# Patient Record
Sex: MALE
[De-identification: names, ages, dates, MRNs, and addresses within clinical notes are randomized per-mention and may not be internally consistent; named-entity substitution may affect disease eponyms.]

---

## 2023-03-16 ENCOUNTER — HOSPITAL ENCOUNTER (OUTPATIENT)
Dept: HOSPITAL 90 - RAH | Age: 58
Discharge: HOME | End: 2023-03-16
Attending: INTERNAL MEDICINE
Payer: COMMERCIAL

## 2023-03-16 DIAGNOSIS — I08.0: Primary | ICD-10-CM

## 2023-03-16 DIAGNOSIS — R01.1: ICD-10-CM

## 2023-03-16 PROCEDURE — 93306 TTE W/DOPPLER COMPLETE: CPT

## 2025-03-01 ENCOUNTER — HOSPITAL ENCOUNTER (EMERGENCY)
Dept: HOSPITAL 90 - EDH | Age: 60
Discharge: HOME | End: 2025-03-01
Payer: MEDICARE

## 2025-03-01 VITALS
SYSTOLIC BLOOD PRESSURE: 155 MMHG | HEART RATE: 86 BPM | OXYGEN SATURATION: 98 % | TEMPERATURE: 98.4 F | RESPIRATION RATE: 20 BRPM | DIASTOLIC BLOOD PRESSURE: 87 MMHG

## 2025-03-01 VITALS — BODY MASS INDEX: 30.47 KG/M2 | HEIGHT: 68 IN | WEIGHT: 201.06 LBS

## 2025-03-01 DIAGNOSIS — Z86.73: ICD-10-CM

## 2025-03-01 DIAGNOSIS — M62.462: ICD-10-CM

## 2025-03-01 DIAGNOSIS — E11.22: ICD-10-CM

## 2025-03-01 DIAGNOSIS — N18.9: ICD-10-CM

## 2025-03-01 DIAGNOSIS — I12.9: Primary | ICD-10-CM

## 2025-03-01 LAB
BASOPHILS # BLD AUTO: 0.01 K/UL (ref 0–0.2)
BASOPHILS NFR BLD AUTO: 0.1 % (ref 0–5)
BUN SERPL-MCNC: 37 MG/DL (ref 7–18)
CHLORIDE SERPL-SCNC: 98 MMOL/L (ref 101–111)
CK SERPL-CCNC: 69 U/L (ref 21–232)
CO2 SERPL-SCNC: 23 MMOL/L (ref 21–32)
CREAT SERPL-MCNC: 1.9 MG/DL (ref 0.5–1.3)
EOSINOPHIL # BLD AUTO: 0.05 K/UL (ref 0–0.7)
EOSINOPHIL NFR BLD AUTO: 0.7 % (ref 0–8)
ERYTHROCYTE [DISTWIDTH] IN BLOOD BY AUTOMATED COUNT: 14.8 % (ref 11–15.5)
GFR SERPL CREATININE-BSD FRML MDRD: 40 ML/MIN (ref 90–?)
GLUCOSE SERPL-MCNC: 125 MG/DL (ref 70–105)
HCT VFR BLD AUTO: 43.9 % (ref 42–54)
IMM GRANULOCYTES # BLD: 0.07 K/UL (ref 0–1)
LYMPHOCYTES # SPEC AUTO: 0.8 K/UL (ref 1–4.8)
LYMPHOCYTES NFR SPEC AUTO: 10.8 % (ref 21–51)
MCH RBC QN AUTO: 30.1 PG (ref 27–33)
MCHC RBC AUTO-ENTMCNC: 33.7 G/DL (ref 32–36)
MCV RBC AUTO: 89.4 FL (ref 79–99)
MONOCYTES # BLD AUTO: 0.7 K/UL (ref 0.1–1)
MONOCYTES NFR BLD AUTO: 9.2 % (ref 3–13)
NEUTROPHILS # BLD AUTO: 6 K/UL (ref 1.8–7.7)
NEUTROPHILS NFR BLD AUTO: 78.3 % (ref 40–77)
NRBC BLD MANUAL-RTO: 0 % (ref 0–0.19)
PLATELET # BLD AUTO: 429 K/UL (ref 130–400)
POTASSIUM SERPL-SCNC: 3.5 MMOL/L (ref 3.5–5.1)
RBC # BLD AUTO: 4.91 MIL/UL (ref 4.5–6.2)
SODIUM SERPL-SCNC: 135 MMOL/L (ref 136–145)
WBC # BLD AUTO: 7.7 K/UL (ref 4.8–10.8)

## 2025-03-01 PROCEDURE — 96372 THER/PROPH/DIAG INJ SC/IM: CPT

## 2025-03-01 PROCEDURE — 82550 ASSAY OF CK (CPK): CPT

## 2025-03-01 PROCEDURE — 80048 BASIC METABOLIC PNL TOTAL CA: CPT

## 2025-03-01 PROCEDURE — 73562 X-RAY EXAM OF KNEE 3: CPT

## 2025-03-01 PROCEDURE — 99285 EMERGENCY DEPT VISIT HI MDM: CPT

## 2025-03-01 PROCEDURE — 93970 EXTREMITY STUDY: CPT

## 2025-03-01 PROCEDURE — 85025 COMPLETE CBC W/AUTO DIFF WBC: CPT

## 2025-03-01 PROCEDURE — 36415 COLL VENOUS BLD VENIPUNCTURE: CPT

## 2025-03-01 RX ADMIN — ORPHENADRINE CITRATE ONE MG: 30 INJECTION INTRAMUSCULAR; INTRAVENOUS at 11:42

## 2025-03-01 NOTE — [facility]
ULTRASOUND VENOUS DOPPLER, BILATERAL LOWER EXTREMITIES



INDICATION: Bilateral lower extremity pain and swelling



TECHNIQUE: Routine grayscale and color Doppler ultrasound of the

bilateral lower extremity veins performed.



COMPARISON: No priors.



FINDINGS:



The demonstrated veins of the bilateral lower extremity including the

common femoral vein, femoral vein, and popliteal vein are associated

with normal compressibility, augmentation, and flow. 



Normal respiratory variation was identified. 



No evidence for echogenic intraluminal thrombus formation. 



IMPRESSION:



No sonographic evidence for deep venous thrombosis within the bilateral

lower extremity veins.

## 2025-03-01 NOTE — ERN
General


Chief Complaint:  Lower Extremity Pain/Injury


Stated Complaint:  LEFT CALF PAIN


Time Seen by MD:  09:52





History of Present Illness


Initial Comments


59-year-old male brought in by EMS from home in [place]  for left knee pain and

cramping.  Patient reports for the last two days he has had left calf and knee 

pain and cramping.  Neurovascularly intact.  There is no obvious deformity or 

injury.  He denies any swelling or trauma.  He does have a history of a stroke 

with left-sided deficits in his decreased ambulation and movement to that leg.  

He also has left upper arm deficit.


Allergies:  


Coded Allergies:  


     No Known Allergies (Unverified  Allergy, Unknown, 3/1/25)





Past Medical History


Past Medical History:  Diabetes-Type II, Hypertension, Stroke


Medical History Other:  LT SIDE WEAKNESS


Past Surgical History:  Other


Surgical History Other:  HEART IMPLANT





ROS Dictation


CONSTITUTIONAL:  No chills, no fever, no weakness, no diaphoresis, no malaise.


HEAD/FACE:  No signs of trauma. 


EENT:  No eye pain, no blurred vision, no tearing, no double vision, no ear 

pain, no ear discharge, no nose pain, no nasal congestion, no throat pain, no 

throat swelling, no mouth pain. 


RESPIRATORY:  No cough, no orthopnea, no SOB, no stridor, no wheezing. 


CARDIOVASCULAR:  No chest pain, no edema, no palpitations, no syncope. 


GASTROINTESTINAL/ABDOMINAL:   No abdominal pain, no constipation, no diarrhea, 

no nausea, no vomiting. 


GENITOURINARY:  No abnormal discharge, no dysuria, no frequent urination, no 

hematuria. No complaints of pain in the genitals. 


MUSCULOSKELETAL:  Left knee pain/cramping behind the knee


INTEGUMENTARY:  No change in color, no change in hair/nails, no dryness, no 

lesion, no lumps, no rash. 


NEUROLOGICAL/PSYCH:  No anxiety, not depressed, no emotional problem, no h

eadache, no numbness, no pre-existing deficit, no history of seizures,  no 

tremors, no weakness. 


HEMATOLOGIC/LYMPHATIC:  Not anemic, no history of blood clots, no apparent 

bleeding, no bruising, glands not swollen.


All Systems Negative, Except as Noted.





Physical Exam


Physical Exam Dictation


VITAL SIGNS:  Reviewed. 


GENERAL APPEARANCE:  Alert, oriented x3, no acute distress


HEAD AND FACE: Non-traumatic. 


EYES:   PERRL, pink conjunctivas, eyelid no trauma, anterior chamber clear. 


EARS:  Pinnas intact and no signs of trauma or erythema.  Ear canals clear and 

no discharge. TMs no erythema. 


NOSE:  No discharge, no bleeding. 


OROPHARYNX:  Mouth normal, teeth no caries, tongue pink.  Pharynx clear, no 

erythema.  Tonsils no exudates, no abscesses noted. Mucous membrane moist.


NECK:  Supple, non-tender, no thyromegaly, no masses, no JVD, no bruits. 


BREAST:  Deferred.


CHEST:   No tenderness, no crepitus, no paradoxical movement, no retractions.


LUNGS:  Clear, well-ventilated, symmetric, no rales, no wheezing, no rhonchi, no

stridor, good breath sounds bilaterally. 


HEART:  Regular rate, regular rhythm, no murmur, no gallops. 


VASCULAR: No peripheral edema.


ABDOMEN: Soft, positive bowel sounds, nondistended, no guarding, nontender, no 

rebound, no masses no hepatomegaly, no splenomegaly, no Anaya's sign, no 

hernias. 


RECTAL: Deferred. 


GENITAL:  Deferred. 


NEUROLOGICAL:  Normal speech, gross motor function intact, gross sensory 

function intact.


MUSCULOSKELETAL:  Decreased range of motion of the left leg and left arm status 

post stroke, chronic for patient.  Otherwise unremarkable.  Neurovascularly 

intact in the leg, able to flex and extend the ankles in the knee.  No obvious 

swelling or deformity.


EXTREMITIES: Nontender, full range of motion. 


SKIN:  Color pink, dry, no turgor, no rash, no lacerations, no abrasions, no 

contusions.


LYMPHATICS:  Deferred.





Results


Laboratory and Microbiology


Lab and Micro Result





Laboratory Tests








Test


 3/1/25


10:41


 


White Blood Count


 7.7 K/uL


(4.8-10.8)


 


Red Blood Count


 4.91 MIL/uL


(4.50-6.20)


 


Hemoglobin


 14.8 g/dL


(14.0-18.0)


 


Hematocrit


 43.9 % (42-54)





 


Mean Corpuscular Volume


 89.4 fL


(79-99)


 


Mean Corpuscular Hemoglobin


 30.1 pg


(27.0-33.0)


 


Mean Corpuscular Hemoglobin


Concent 33.7 g/dL


(32.0-36.0)


 


Red Cell Distribution Width


 14.8 %


(11.0-15.5)


 


Platelet Count


 429 K/uL


(130-400)  H


 


Mean Platelet Volume


 9.5 fL


(7.5-10.5)


 


Immature Granulocyte % (Auto) 0.9 % (0-1)  


 


Neutrophils (%) (Auto)


 78.3 %


(40.0-77.0)  H


 


Lymphocytes (%) (Auto)


 10.8 %


(21.0-51.0)  L


 


Monocytes (%) (Auto)


 9.2 %


(3.0-13.0)


 


Eosinophils (%) (Auto)


 0.7 %


(0.0-8.0)


 


Basophils (%) (Auto)


 0.1 %


(0.0-5.0)


 


Neutrophils # (Auto)


 6.0 K/uL


(1.8-7.7)


 


Lymphocytes # (Auto)


 0.8 K/uL


(1.0-4.8)  L


 


Monocytes # (Auto)


 0.7 K/uL


(0.1-1.0)


 


Eosinophils # (Auto)


 0.05 K/uL


(0.00-0.70)


 


Basophils # (Auto)


 0.01 K/uL


(0.00-0.20)


 


Absolute Immature Granulocyte


(auto 0.07 K/uL


(0-1)


 


Nucleated Red Blood Cells


 0.0 %


(0.0-0.19)


 


Sodium Level


 135 mmol/L


(136-145)  L


 


Potassium Level


 3.5 mmol/L


(3.5-5.1)


 


Chloride Level


 98 mmol/L


(101-111)  L


 


Carbon Dioxide Level


 23 mmol/L


(21-32)


 


Blood Urea Nitrogen


 37 mg/dL


(7-18)  H


 


Creatinine


 1.9 mg/dL


(0.5-1.3)  H


 


Glomerular Filtration Rate


Calc 40 mL/min


(>90)


 


Random Glucose


 125 mg/dL


()  H


 


Total Calcium


 8.9 mg/dL


(8.5-10.1)


 


Total Creatine Kinase


 69 U/L


()











MDM


  CC: Left calf pain and cramping 


Historian:  Patient 


Comorbidities:  Diabetes type 2, hypertension, history of stroke with the 

contractions 


Limitations by social determinants of health:  None


Differential diagnosis:  Cramping /musculoskeletal pain, DVT, bony abnormality, 

other.  


Vital signs:  Stable, remained stable here in the ER.  


Patient was a GCS of 15.  Neurovascularly intact.  He was moving his arms and 

legs at baseline for him.  


Labs (independently ordered and interpreted by me):  CBC normal, BMP is 

creatinine 1.9 otherwise unremarkable.  CK is stable.


  External chart review:  No previous kidney labs.  I suspect this is chronic 

CKD since he reports CKD history.  


DVT study (independently interpreted by me ):  No evidence of DVT.  


Right knee x-ray ( independently interpreted by me ):  No bony abnormalities, he

does have severe peripheral vascular disease, no major effusions.  


Treatment in ED: IV morphine, IV Toradol, orphenadrine


Re-evaluation:  Pain improved.  


I do not see any life-threatening pathology.  No signs of blood clots or  acute 

neurovascular disease.  No signs of infection.  No signs of stroke or any  

neurologic disorder.  I suspect musculoskeletal pain due To the contractions.


Plan: we will DC with orphenadrine for muscle relaxer and an NSAID, short course

due to the CKD.  We will recommend Tylenol.  We will recommend PCP follow up as 

needed.


ED Course





Orders








Procedure Category Date Status





  Time 


 


Ketorolac PHA 3/1/25 Complete





Tromethamine 15mg/Ml  10:00 


 


Us Venous Doppler US 3/1/25 Resulted





Bilateral  09:52 


 


Knee 3vws Lt RAD 3/1/25 Taken





  09:52 


 


Cbc With Differential LAB 3/1/25 Complete





  09:52 


 


Basic Metabolic Panel LAB 3/1/25 Complete





  09:52 


 


Creatine Kinase, Total LAB 3/1/25 Complete





  09:52 


 


Orphenadrine Citrate PHA 3/1/25 Complete





 (Norflex)  10:00 


 


Morphine 4mg Syg PHA 3/1/25 Complete





 (Morphine 4mg Syg)  10:00 


 


Orphenadrine Citrate PHA 3/1/25 In Process





 (Norflex)  12:00 


 


Ketorolac PHA 3/1/25 Complete





Tromethamine 15mg/Ml  11:30 


 


Morphine 4mg Syg PHA 3/1/25 Complete





 (Morphine 4mg Syg)  11:30 








Current Medications








 Medications


  (Trade)  Dose


 Ordered  Sig/Shelley


 Route


 PRN Reason  Start Time


 Stop Time Status Last Admin


Dose Admin


 


 Ketorolac


 Tromethamine


  (toRADol)  15 mg  ONCE  ONCE


 IM


   3/1/25 11:30


 3/1/25 11:35 DC 3/1/25 11:42





 


 Ketorolac


 Tromethamine


  (toRADol)  15 mg  ONCE  ONCE


 IV


   3/1/25 10:00


 3/1/25 11:32 DC  





 


 Morphine Sulfate


  (morPHINE 4MG


 SYG)  4 mg  ONCE  ONCE


 IM


   3/1/25 11:30


 3/1/25 11:38 DC 3/1/25 11:42





 


 Morphine Sulfate


  (morPHINE 4MG


 SYG)  4 mg  ONCE  ONCE


 IVP


   3/1/25 10:00


 3/1/25 11:32 DC  





 


 Orphenadrine


 Citrate


  (Norflex)  60 mg  ONCE  ONCE


 IM


   3/1/25 12:00


 3/1/25 12:01  3/1/25 11:42





 


 Orphenadrine


 Citrate


  (Norflex)  60 mg  ONCE  ONCE


 IVP


   3/1/25 10:00


 3/1/25 11:32 DC  











Vital Signs








  Date Time  Temp Pulse Resp B/P (MAP) Pulse Ox O2 Delivery O2 Flow Rate FiO2


 


3/1/25 11:23 98.4 86 20 155/87 98 Room Air* 0 21


 


3/1/25 09:49 98.4 86  155/87 98 Room Air 0 











DX & DISP


Disposition:  Discharge


Departure


Impression:  


   Primary Impression:  Leg cramping


   Additional Impressions:  Contracture of muscle of lower leg, CKD (chronic 

   kidney disease)


Condition:  Stable


Scripts


Meloxicam (Meloxicam) 15 Mg Tablet


15 MG PO DAILY PRN for PAIN for 10 Days, #10 TAB


   Prov: SUNDAY MCMILLAN DO         3/1/25 


Orphenadrine Citrate (Orphenadrine Citrate) 100 Mg Tablet.er


1 TAB PO [id number] PRN for pain for 10 Days, #20 TAB 0 Refills


   Prov: SUNDAY MCMILLAN DO         3/1/25


Referrals:  


TRACE SHETTY MD (PCP)











SUNDAY MCMILLAN DO                 Mar 1, 2025 09:57

## 2025-03-01 NOTE — [facility]
LEFT KNEE RADIOGRAPHS - 3 VIEWS



INDICATION: Pain



COMPARISON: None



FINDINGS: AP, lateral, and oblique views.



Examination provided for interpretation at 9:42 PM on 3/1/2025.



No fracture or dislocation identified.



No significant joint effusion is present.



Extensive arterial wall calcific plaque.



No radiopaque foreign body noted.



IMPRESSION:



Extensive arterial wall calcific plaque without evidence for fracture or

dislocation.